# Patient Record
Sex: MALE | Race: OTHER | HISPANIC OR LATINO | Employment: UNEMPLOYED | URBAN - METROPOLITAN AREA
[De-identification: names, ages, dates, MRNs, and addresses within clinical notes are randomized per-mention and may not be internally consistent; named-entity substitution may affect disease eponyms.]

---

## 2023-05-05 ENCOUNTER — HOSPITAL ENCOUNTER (EMERGENCY)
Facility: HOSPITAL | Age: 43
Discharge: HOME | End: 2023-05-06
Attending: EMERGENCY MEDICINE

## 2023-05-05 VITALS
RESPIRATION RATE: 16 BRPM | TEMPERATURE: 98.1 F | HEART RATE: 87 BPM | SYSTOLIC BLOOD PRESSURE: 143 MMHG | DIASTOLIC BLOOD PRESSURE: 78 MMHG | OXYGEN SATURATION: 100 % | WEIGHT: 225.31 LBS

## 2023-05-05 DIAGNOSIS — L50.9 URTICARIA: Primary | ICD-10-CM

## 2023-05-06 LAB
ALBUMIN SERPL BCP-MCNC: 3.9 G/DL (ref 3.5–5)
ALP SERPL-CCNC: 76 U/L (ref 34–104)
ALT SERPL W P-5'-P-CCNC: 23 U/L (ref 7–52)
ANION GAP SERPL CALCULATED.3IONS-SCNC: 10 MMOL/L (ref 4–13)
APTT PPP: 29 SECONDS (ref 23–37)
AST SERPL W P-5'-P-CCNC: 15 U/L (ref 13–39)
BASOPHILS # BLD AUTO: 0.04 THOUSANDS/ÂΜL (ref 0–0.1)
BASOPHILS NFR BLD AUTO: 1 % (ref 0–1)
BILIRUB SERPL-MCNC: 1 MG/DL (ref 0.2–1)
BUN SERPL-MCNC: 17 MG/DL (ref 5–25)
CALCIUM SERPL-MCNC: 9 MG/DL (ref 8.4–10.2)
CHLORIDE SERPL-SCNC: 99 MMOL/L (ref 96–108)
CO2 SERPL-SCNC: 27 MMOL/L (ref 21–32)
CREAT SERPL-MCNC: 0.95 MG/DL (ref 0.6–1.3)
EOSINOPHIL # BLD AUTO: 0.35 THOUSAND/ÂΜL (ref 0–0.61)
EOSINOPHIL NFR BLD AUTO: 4 % (ref 0–6)
ERYTHROCYTE [DISTWIDTH] IN BLOOD BY AUTOMATED COUNT: 13.2 % (ref 11.6–15.1)
GFR SERPL CREATININE-BSD FRML MDRD: 99 ML/MIN/1.73SQ M
GLUCOSE SERPL-MCNC: 75 MG/DL (ref 65–140)
HCT VFR BLD AUTO: 42.9 % (ref 36.5–49.3)
HGB BLD-MCNC: 14.1 G/DL (ref 12–17)
IMM GRANULOCYTES # BLD AUTO: 0.03 THOUSAND/UL (ref 0–0.2)
IMM GRANULOCYTES NFR BLD AUTO: 0 % (ref 0–2)
INR PPP: 1.24 (ref 0.84–1.19)
LYMPHOCYTES # BLD AUTO: 1.68 THOUSANDS/ÂΜL (ref 0.6–4.47)
LYMPHOCYTES NFR BLD AUTO: 20 % (ref 14–44)
MCH RBC QN AUTO: 27.5 PG (ref 26.8–34.3)
MCHC RBC AUTO-ENTMCNC: 32.9 G/DL (ref 31.4–37.4)
MCV RBC AUTO: 84 FL (ref 82–98)
MONOCYTES # BLD AUTO: 0.64 THOUSAND/ÂΜL (ref 0.17–1.22)
MONOCYTES NFR BLD AUTO: 7 % (ref 4–12)
NEUTROPHILS # BLD AUTO: 5.88 THOUSANDS/ÂΜL (ref 1.85–7.62)
NEUTS SEG NFR BLD AUTO: 68 % (ref 43–75)
NRBC BLD AUTO-RTO: 0 /100 WBCS
PLATELET # BLD AUTO: 249 THOUSANDS/UL (ref 149–390)
PMV BLD AUTO: 10.6 FL (ref 8.9–12.7)
POTASSIUM SERPL-SCNC: 3.8 MMOL/L (ref 3.5–5.3)
PROT SERPL-MCNC: 7.1 G/DL (ref 6.4–8.4)
PROTHROMBIN TIME: 15.6 SECONDS (ref 11.6–14.5)
RBC # BLD AUTO: 5.13 MILLION/UL (ref 3.88–5.62)
SODIUM SERPL-SCNC: 136 MMOL/L (ref 135–147)
WBC # BLD AUTO: 8.62 THOUSAND/UL (ref 4.31–10.16)

## 2023-05-06 RX ORDER — DEXAMETHASONE SODIUM PHOSPHATE 10 MG/ML
10 INJECTION, SOLUTION INTRAMUSCULAR; INTRAVENOUS ONCE
Status: COMPLETED | OUTPATIENT
Start: 2023-05-06 | End: 2023-05-06

## 2023-05-06 RX ORDER — DIPHENHYDRAMINE HCL 25 MG
25 TABLET ORAL EVERY 6 HOURS
Qty: 20 TABLET | Refills: 0 | Status: SHIPPED | OUTPATIENT
Start: 2023-05-06

## 2023-05-06 RX ORDER — DIPHENHYDRAMINE HYDROCHLORIDE 50 MG/ML
50 INJECTION INTRAMUSCULAR; INTRAVENOUS ONCE
Status: COMPLETED | OUTPATIENT
Start: 2023-05-06 | End: 2023-05-06

## 2023-05-06 RX ORDER — FAMOTIDINE 10 MG/ML
20 INJECTION, SOLUTION INTRAVENOUS ONCE
Status: COMPLETED | OUTPATIENT
Start: 2023-05-06 | End: 2023-05-06

## 2023-05-06 RX ORDER — FAMOTIDINE 20 MG/1
20 TABLET, FILM COATED ORAL 2 TIMES DAILY
Qty: 30 TABLET | Refills: 0 | Status: SHIPPED | OUTPATIENT
Start: 2023-05-06

## 2023-05-06 RX ADMIN — DEXAMETHASONE SODIUM PHOSPHATE 10 MG: 10 INJECTION INTRAMUSCULAR; INTRAVENOUS at 00:37

## 2023-05-06 RX ADMIN — DIPHENHYDRAMINE HYDROCHLORIDE 50 MG: 50 INJECTION, SOLUTION INTRAMUSCULAR; INTRAVENOUS at 00:37

## 2023-05-06 RX ADMIN — FAMOTIDINE 20 MG: 10 INJECTION INTRAVENOUS at 00:37

## 2023-05-06 NOTE — ED PROVIDER NOTES
History  Chief Complaint   Patient presents with   • Rash     Pt reports has gastric sleeve on 5/1/23, reports noted rash to abdomen, back and legs since last night  Pt reports was referred to ED by surgeon  Patient is a 71-year-old male with history of allergy to aspirin, NSAIDs, penicillins who presents to the ED for evaluation of rash to his abdomen, back, and legs  He reports that on 5/1/2023, he had a gastric sleeve done at [facility]  On 5/3/2023, he began developing a rash to his abdomen  He reports it is pruritic in nature  He called his surgeon, who instructed him to stop using the Lovenox injections he was prescribed  He reports that his surgeon instructed him that if the rash did not improve following this, he should report to the ER immediately as it may be related to the Lovenox  He states that he took Benadryl last night with minimal improvement in pruritus  He does report that prior to surgery, he was given IV antibiotics and developed pruritus to his scalp, and the antibiotics were stopped  He denies exposure to any other medications since surgery  He denies history of similar rash  He otherwise denies exposure to new foods, soaps, detergents, materials, vomiting, abdominal pain outside of mild soreness at surgical site, lip/tongue swelling, dysphagia, sore throat, dyspnea, wheezing  None       No past medical history on file  No past surgical history on file  No family history on file  I have reviewed and agree with the history as documented  No existing history information found  No existing history information found  Review of Systems   Constitutional: Negative for chills and fever  HENT: Negative for congestion, facial swelling, rhinorrhea, sore throat and trouble swallowing  Respiratory: Negative for cough and shortness of breath  Cardiovascular: Negative for chest pain and leg swelling     Gastrointestinal: Positive for abdominal pain (mild soreness at surgical site which has improved)  Negative for constipation, diarrhea, nausea and vomiting  Genitourinary: Negative for dysuria and flank pain  Musculoskeletal: Negative for arthralgias and myalgias  Skin: Positive for rash  Negative for wound  Neurological: Negative for dizziness, weakness, numbness and headaches  Psychiatric/Behavioral: Negative for behavioral problems  Physical Exam  Physical Exam  Vitals and nursing note reviewed  Constitutional:       General: He is not in acute distress  Appearance: He is well-developed  HENT:      Head: Normocephalic and atraumatic  Mouth/Throat:      Mouth: No angioedema  Pharynx: Uvula midline  No pharyngeal swelling  Comments: Normal phonation  Tolerating oral secretions  Eyes:      Conjunctiva/sclera: Conjunctivae normal    Cardiovascular:      Rate and Rhythm: Normal rate and regular rhythm  Heart sounds: No murmur heard  Pulmonary:      Effort: Pulmonary effort is normal  No respiratory distress  Breath sounds: Normal breath sounds  No stridor  No wheezing  Abdominal:      Palpations: Abdomen is soft  Tenderness: There is no abdominal tenderness  Musculoskeletal:         General: No swelling  Cervical back: Neck supple  Skin:     General: Skin is warm and dry  Capillary Refill: Capillary refill takes less than 2 seconds  Comments: Multiple surgical sites clean, dry, intact with no drainage or surrounding erythema  Erythematous, maculopapular rash to the trunk, upper legs, and small amount of right wrist   Rash blanches  No drainage, no sloughing of skin  Neurological:      Mental Status: He is alert     Psychiatric:         Mood and Affect: Mood normal          Vital Signs  ED Triage Vitals [05/05/23 2336]   Temperature Pulse Respirations Blood Pressure SpO2   98 1 °F (36 7 °C) 87 16 143/78 100 %      Temp Source Heart Rate Source Patient Position - Orthostatic VS BP Location FiO2 (%)   Oral Monitor Sitting Right arm --      Pain Score       --           Vitals:    05/05/23 2336   BP: 143/78   Pulse: 87   Patient Position - Orthostatic VS: Sitting         Visual Acuity      ED Medications  Medications   Famotidine (PF) (PEPCID) injection 20 mg (20 mg Intravenous Given 5/6/23 0037)   diphenhydrAMINE (BENADRYL) injection 50 mg (50 mg Intravenous Given 5/6/23 0037)   dexamethasone (PF) (DECADRON) injection 10 mg (10 mg Intravenous Given 5/6/23 0037)       Diagnostic Studies  Results Reviewed     Procedure Component Value Units Date/Time    Comprehensive metabolic panel [[id number]] Collected: 05/06/23 0035    Lab Status: Final result Specimen: Blood from Arm, Right Updated: 05/06/23 0108     Sodium 136 mmol/L      Potassium 3 8 mmol/L      Chloride 99 mmol/L      CO2 27 mmol/L      ANION GAP 10 mmol/L      BUN 17 mg/dL      Creatinine 0 95 mg/dL      Glucose 75 mg/dL      Calcium 9 0 mg/dL      AST 15 U/L      ALT 23 U/L      Alkaline Phosphatase 76 U/L      Total Protein 7 1 g/dL      Albumin 3 9 g/dL      Total Bilirubin 1 00 mg/dL      eGFR 99 ml/min/1 73sq m     Narrative:      Noel guidelines for Chronic Kidney Disease (CKD):   •  Stage 1 with normal or high GFR (GFR > 90 mL/min/1 73 square meters)  •  Stage 2 Mild CKD (GFR = 60-89 mL/min/1 73 square meters)  •  Stage 3A Moderate CKD (GFR = 45-59 mL/min/1 73 square meters)  •  Stage 3B Moderate CKD (GFR = 30-44 mL/min/1 73 square meters)  •  Stage 4 Severe CKD (GFR = 15-29 mL/min/1 73 square meters)  •  Stage 5 End Stage CKD (GFR <15 mL/min/1 73 square meters)  Note: GFR calculation is accurate only with a steady state creatinine    Protime-INR [[id number]]  (Abnormal) Collected: 05/06/23 0035    Lab Status: Final result Specimen: Blood from Arm, Right Updated: 05/06/23 0105     Protime 15 6 seconds      INR 1 24    APTT [[id number]]  (Normal) Collected: 05/06/23 0035    Lab Status: Final result Specimen: Blood from Arm, Right Updated: 05/06/23 0105     PTT 29 seconds     CBC and differential [[id number]] Collected: 05/06/23 0035    Lab Status: Final result Specimen: Blood from Arm, Right Updated: 05/06/23 0051     WBC 8 62 Thousand/uL      RBC 5 13 Million/uL      Hemoglobin 14 1 g/dL      Hematocrit 42 9 %      MCV 84 fL      MCH 27 5 pg      MCHC 32 9 g/dL      RDW 13 2 %      MPV 10 6 fL      Platelets 954 Thousands/uL      nRBC 0 /100 WBCs      Neutrophils Relative 68 %      Immat GRANS % 0 %      Lymphocytes Relative 20 %      Monocytes Relative 7 %      Eosinophils Relative 4 %      Basophils Relative 1 %      Neutrophils Absolute 5 88 Thousands/µL      Immature Grans Absolute 0 03 Thousand/uL      Lymphocytes Absolute 1 68 Thousands/µL      Monocytes Absolute 0 64 Thousand/µL      Eosinophils Absolute 0 35 Thousand/µL      Basophils Absolute 0 04 Thousands/µL                  No orders to display              Procedures  Procedures         ED Course     Medical Decision Making  DDx including but not limited to: Allergic reaction, urticaria, angioedema, cellulitis, HIT  Exam not consistent with anaphylaxis  Labs unremarkable  No thrombocytopenia  On re-examination, patient in no acute distress, vitals stable  Will discharge     At the time of discharge, the patient is in no acute distress  I discussed with the patient the diagnosis, treatment plan, follow-up, return precautions, and discharge instructions; they were given the opportunity to ask questions and verbalized understanding  Urticaria: acute illness or injury  Amount and/or Complexity of Data Reviewed  External Data Reviewed: labs and notes  Labs: ordered  Decision-making details documented in ED Course  Risk  OTC drugs  Prescription drug management            Disposition  Final diagnoses:   Urticaria     Time reflects when diagnosis was documented in both MDM as applicable and the Disposition within this note     Time User Action Codes Description Comment    5/6/2023  1:30 AM Ketty Reyes Add [L50 9] Urticaria       ED Disposition     ED Disposition   Discharge    Condition   Stable    Date/Time   Sat May 6, 2023  1:30 AM    Comment   Mervin Harris discharge to home/self care  Follow-up Information    None         Discharge Medication List as of 5/6/2023  1:34 AM      START taking these medications    Details   diphenhydrAMINE (BENADRYL) 25 mg tablet Take 1 tablet (25 mg total) by mouth every 6 (six) hours, Starting Sat 5/6/2023, Print      famotidine (PEPCID) 20 mg tablet Take 1 tablet (20 mg total) by mouth 2 (two) times a day, Starting Sat 5/6/2023, Print             No discharge procedures on file      PDMP Review     None          ED Provider  Electronically Signed by           Radha Duenas PA-C  05/07/23 6740

## 2023-05-06 NOTE — DISCHARGE INSTRUCTIONS
Take Pepcid and Benadryl as prescribed    Follow up with surgery ASAP - Call office Monday    Return to the ER immediately for any trouble breathing, facial swelling, trouble swallowing, lip/tongue itchiness, abdominal pain, or any other new/concerning symptoms